# Patient Record
Sex: FEMALE | Race: WHITE | NOT HISPANIC OR LATINO | URBAN - METROPOLITAN AREA
[De-identification: names, ages, dates, MRNs, and addresses within clinical notes are randomized per-mention and may not be internally consistent; named-entity substitution may affect disease eponyms.]

---

## 2017-06-28 ENCOUNTER — APPOINTMENT (RX ONLY)
Dept: URBAN - METROPOLITAN AREA CLINIC 137 | Facility: CLINIC | Age: 82
Setting detail: DERMATOLOGY
End: 2017-06-28

## 2017-06-28 VITALS
SYSTOLIC BLOOD PRESSURE: 116 MMHG | WEIGHT: 132 LBS | HEIGHT: 67 IN | HEART RATE: 82 BPM | RESPIRATION RATE: 16 BRPM | DIASTOLIC BLOOD PRESSURE: 74 MMHG

## 2017-06-28 DIAGNOSIS — L85.3 XEROSIS CUTIS: ICD-10-CM

## 2017-06-28 DIAGNOSIS — L82.1 OTHER SEBORRHEIC KERATOSIS: ICD-10-CM

## 2017-06-28 PROBLEM — M12.9 ARTHROPATHY, UNSPECIFIED: Status: ACTIVE | Noted: 2017-06-28

## 2017-06-28 PROBLEM — Z85.828 PERSONAL HISTORY OF OTHER MALIGNANT NEOPLASM OF SKIN: Status: ACTIVE | Noted: 2017-06-28

## 2017-06-28 PROCEDURE — ? TREATMENT REGIMEN

## 2017-06-28 PROCEDURE — 99202 OFFICE O/P NEW SF 15 MIN: CPT

## 2017-06-28 PROCEDURE — ? COUNSELING

## 2017-06-28 ASSESSMENT — PAIN INTENSITY VAS: HOW INTENSE IS YOUR PAIN 0 BEING NO PAIN, 10 BEING THE MOST SEVERE PAIN POSSIBLE?: NO PAIN

## 2019-04-15 ENCOUNTER — APPOINTMENT (RX ONLY)
Dept: URBAN - METROPOLITAN AREA CLINIC 137 | Facility: CLINIC | Age: 84
Setting detail: DERMATOLOGY
End: 2019-04-15

## 2019-04-15 VITALS — DIASTOLIC BLOOD PRESSURE: 70 MMHG | RESPIRATION RATE: 16 BRPM | SYSTOLIC BLOOD PRESSURE: 142 MMHG

## 2019-04-15 DIAGNOSIS — Q80.8 OTHER CONGENITAL ICHTHYOSIS: ICD-10-CM

## 2019-04-15 DIAGNOSIS — I87.2 VENOUS INSUFFICIENCY (CHRONIC) (PERIPHERAL): ICD-10-CM

## 2019-04-15 DIAGNOSIS — L21.8 OTHER SEBORRHEIC DERMATITIS: ICD-10-CM

## 2019-04-15 DIAGNOSIS — D485 NEOPLASM OF UNCERTAIN BEHAVIOR OF SKIN: ICD-10-CM

## 2019-04-15 PROBLEM — E13.9 OTHER SPECIFIED DIABETES MELLITUS WITHOUT COMPLICATIONS: Status: ACTIVE | Noted: 2019-04-15

## 2019-04-15 PROBLEM — J30.1 ALLERGIC RHINITIS DUE TO POLLEN: Status: ACTIVE | Noted: 2019-04-15

## 2019-04-15 PROBLEM — D48.5 NEOPLASM OF UNCERTAIN BEHAVIOR OF SKIN: Status: ACTIVE | Noted: 2019-04-15

## 2019-04-15 PROCEDURE — ? COUNSELING

## 2019-04-15 PROCEDURE — ? TREATMENT REGIMEN

## 2019-04-15 PROCEDURE — 69100 BIOPSY OF EXTERNAL EAR: CPT

## 2019-04-15 PROCEDURE — 99213 OFFICE O/P EST LOW 20 MIN: CPT | Mod: 25

## 2019-04-15 PROCEDURE — ? BIOPSY BY SHAVE METHOD

## 2019-04-15 ASSESSMENT — LOCATION DETAILED DESCRIPTION DERM: LOCATION DETAILED: RIGHT SUPERIOR CRUS OF ANTIHELIX

## 2019-04-15 ASSESSMENT — LOCATION SIMPLE DESCRIPTION DERM: LOCATION SIMPLE: RIGHT EAR

## 2019-04-15 ASSESSMENT — LOCATION ZONE DERM: LOCATION ZONE: EAR

## 2019-04-15 NOTE — PROCEDURE: BIOPSY BY SHAVE METHOD
Size Of Lesion In Cm: 0.3
Anesthesia Volume In Cc: 0.5
Biopsy Type: H and E
Silver Nitrate Text: The wound bed was treated with silver nitrate after the biopsy was performed.
Hemostasis: Drysol
Electrodesiccation Text: The wound bed was treated with electrodesiccation after the biopsy was performed.
X Size Of Lesion In Cm: 0
Anesthesia Type: 1% lidocaine with epinephrine
Body Location Override (Optional - Billing Will Still Be Based On Selected Body Map Location If Applicable): above right ear
Was A Bandage Applied: Yes
Bill 26847 For Specimen Handling/Conveyance To Laboratory?: no
Dressing: bandage
Post-Care Instructions: I reviewed with the patient in detail post-care instructions. Patient is to keep the biopsy site dry overnight, and then apply bacitracin twice daily until healed. Patient may apply hydrogen peroxide soaks to remove any crusting.
Curettage Text: The wound bed was treated with curettage after the biopsy was performed.
Type Of Destruction Used: Curettage
Cryotherapy Text: The wound bed was treated with cryotherapy after the biopsy was performed.
Biopsy Method: Dermablade
Billing Type: Third-Party Bill
Wound Care: Petrolatum
Electrodesiccation And Curettage Text: The wound bed was treated with electrodesiccation and curettage after the biopsy was performed.
Detail Level: Detailed
Notification Instructions: Patient will be notified of biopsy results. However, patient instructed to call the office if not contacted within 2 weeks.
Consent: Written consent was obtained and risks were reviewed including but not limited to scarring, infection, bleeding, scabbing, incomplete removal, nerve damage and allergy to anesthesia.
Depth Of Biopsy: dermis

## 2019-04-15 NOTE — PROCEDURE: TREATMENT REGIMEN
Initiate Treatment: Apply hot water bottle to the affected areas of right lower leg daily for 10 minutes.
Detail Level: Zone
Initiate Treatment: Cerave

## 2019-04-16 ENCOUNTER — APPOINTMENT (RX ONLY)
Dept: URBAN - METROPOLITAN AREA CLINIC 137 | Facility: CLINIC | Age: 84
Setting detail: DERMATOLOGY
End: 2019-04-16

## 2019-04-16 PROCEDURE — 88305 TISSUE EXAM BY PATHOLOGIST: CPT | Mod: TC

## 2019-04-22 ENCOUNTER — APPOINTMENT (RX ONLY)
Dept: URBAN - METROPOLITAN AREA CLINIC 137 | Facility: CLINIC | Age: 84
Setting detail: DERMATOLOGY
End: 2019-04-22

## 2019-04-22 PROCEDURE — 88305 TISSUE EXAM BY PATHOLOGIST: CPT | Mod: 26

## 2019-05-08 ENCOUNTER — APPOINTMENT (RX ONLY)
Dept: URBAN - METROPOLITAN AREA CLINIC 137 | Facility: CLINIC | Age: 84
Setting detail: DERMATOLOGY
End: 2019-05-08

## 2019-05-08 DIAGNOSIS — Q80.8 OTHER CONGENITAL ICHTHYOSIS: ICD-10-CM

## 2019-05-08 DIAGNOSIS — L82.1 OTHER SEBORRHEIC KERATOSIS: ICD-10-CM

## 2019-05-08 DIAGNOSIS — L57.0 ACTINIC KERATOSIS: ICD-10-CM

## 2019-05-08 DIAGNOSIS — L95.0 LIVEDOID VASCULITIS: ICD-10-CM

## 2019-05-08 DIAGNOSIS — L81.4 OTHER MELANIN HYPERPIGMENTATION: ICD-10-CM

## 2019-05-08 PROBLEM — L29.8 OTHER PRURITUS: Status: ACTIVE | Noted: 2019-05-08

## 2019-05-08 PROBLEM — I10 ESSENTIAL (PRIMARY) HYPERTENSION: Status: ACTIVE | Noted: 2019-05-08

## 2019-05-08 PROCEDURE — 99213 OFFICE O/P EST LOW 20 MIN: CPT | Mod: 25

## 2019-05-08 PROCEDURE — ? COUNSELING

## 2019-05-08 PROCEDURE — 17000 DESTRUCT PREMALG LESION: CPT

## 2019-05-08 PROCEDURE — ? LIQUID NITROGEN (CM)

## 2019-05-08 ASSESSMENT — PAIN INTENSITY VAS: HOW INTENSE IS YOUR PAIN 0 BEING NO PAIN, 10 BEING THE MOST SEVERE PAIN POSSIBLE?: NO PAIN

## 2019-05-08 ASSESSMENT — LOCATION ZONE DERM: LOCATION ZONE: HAND

## 2019-05-08 ASSESSMENT — LOCATION SIMPLE DESCRIPTION DERM: LOCATION SIMPLE: LEFT HAND

## 2019-05-08 ASSESSMENT — LOCATION DETAILED DESCRIPTION DERM: LOCATION DETAILED: LEFT RADIAL DORSAL HAND

## 2019-05-08 NOTE — PROCEDURE: LIQUID NITROGEN
Detail Level: Detailed
Consent: Liquid N2 was applied with the cry-ac to «*» actinic type keratoses. Post cryo care was explained in detail. Return prn anything but the anticipated result of smooth, flat, whitish scar(s)--i.e., persistent discolored &/or tender &/or rough spot(s)-- or infection (explained). Return for FU in «*» days, prn before as discussed.  Potential for development of a white (hypo pigmented) scar at cryo site(s) emphasized prior to performing the cryo-Rx with pt. accepting the risk.
Duration Of Freeze Thaw-Cycle (Seconds): 10
Size Of Lesion In Cm (Optional): 0.8
Post-Care Instructions: I reviewed with the patient in detail post-care instructions. Patient is to wear sunprotection, and avoid picking at any of the treated lesions. Pt may apply Vaseline to crusted or scabbing areas.
X Size Of Lesion In Cm (Optional): 0
Number Of Freeze-Thaw Cycles: 1 freeze-thaw cycle

## 2021-09-03 ENCOUNTER — APPOINTMENT (RX ONLY)
Dept: URBAN - METROPOLITAN AREA CLINIC 136 | Facility: CLINIC | Age: 86
Setting detail: DERMATOLOGY
End: 2021-09-03

## 2021-09-03 VITALS — HEIGHT: 67 IN | WEIGHT: 129 LBS

## 2021-09-03 DIAGNOSIS — D485 NEOPLASM OF UNCERTAIN BEHAVIOR OF SKIN: ICD-10-CM

## 2021-09-03 DIAGNOSIS — L85.3 XEROSIS CUTIS: ICD-10-CM

## 2021-09-03 DIAGNOSIS — L08.9 LOCAL INFECTION OF THE SKIN AND SUBCUTANEOUS TISSUE, UNSPECIFIED: ICD-10-CM | Status: INADEQUATELY CONTROLLED

## 2021-09-03 PROBLEM — D48.5 NEOPLASM OF UNCERTAIN BEHAVIOR OF SKIN: Status: ACTIVE | Noted: 2021-09-03

## 2021-09-03 PROBLEM — Q80.0 ICHTHYOSIS VULGARIS: Status: ACTIVE | Noted: 2021-09-03

## 2021-09-03 PROCEDURE — ? TREATMENT REGIMEN

## 2021-09-03 PROCEDURE — ? PRESCRIPTION

## 2021-09-03 PROCEDURE — ? ORDER TESTS

## 2021-09-03 PROCEDURE — 99214 OFFICE O/P EST MOD 30 MIN: CPT

## 2021-09-03 PROCEDURE — ? COUNSELING

## 2021-09-03 RX ORDER — DOXYCYCLINE HYCLATE 100 MG/1
CAPSULE, GELATIN COATED ORAL
Qty: 28 | Refills: 0 | Status: ERX | COMMUNITY
Start: 2021-09-03

## 2021-09-03 RX ADMIN — DOXYCYCLINE HYCLATE: 100 CAPSULE, GELATIN COATED ORAL at 00:00

## 2021-09-03 ASSESSMENT — LOCATION DETAILED DESCRIPTION DERM
LOCATION DETAILED: RIGHT SUPERIOR PARIETAL SCALP
LOCATION DETAILED: RIGHT MEDIAL FRONTAL SCALP

## 2021-09-03 ASSESSMENT — LOCATION SIMPLE DESCRIPTION DERM
LOCATION SIMPLE: RIGHT SCALP
LOCATION SIMPLE: SCALP

## 2021-09-03 ASSESSMENT — LOCATION ZONE DERM: LOCATION ZONE: SCALP

## 2021-09-03 NOTE — PROCEDURE: COUNSELING
Detail Level: Detailed
Patient Specific Counseling (Will Not Stick From Patient To Patient): will bx in two weeks after finishing abx.

## 2021-09-03 NOTE — PROCEDURE: MIPS QUALITY
Quality 265: Biopsy Follow-Up: Biopsy results reviewed, communicated, tracked, and documented
Quality 226: Preventive Care And Screening: Tobacco Use: Screening And Cessation Intervention: Patient screened for tobacco use and is an ex/non-smoker
Quality 431: Preventive Care And Screening: Unhealthy Alcohol Use - Screening: Patient identified as an unhealthy alcohol user when screened for unhealthy alcohol use using a systematic screening method and received brief counseling
Detail Level: Detailed
Quality 110: Preventive Care And Screening: Influenza Immunization: Influenza Immunization Administered during Influenza season
Quality 130: Documentation Of Current Medications In The Medical Record: Current Medications Documented

## 2021-09-21 ENCOUNTER — APPOINTMENT (RX ONLY)
Dept: URBAN - METROPOLITAN AREA CLINIC 136 | Facility: CLINIC | Age: 86
Setting detail: DERMATOLOGY
End: 2021-09-21

## 2021-09-21 DIAGNOSIS — L08.9 LOCAL INFECTION OF THE SKIN AND SUBCUTANEOUS TISSUE, UNSPECIFIED: ICD-10-CM | Status: RESOLVING

## 2021-09-21 DIAGNOSIS — L85.3 XEROSIS CUTIS: ICD-10-CM

## 2021-09-21 PROCEDURE — ? COUNSELING

## 2021-09-21 PROCEDURE — ? TREATMENT REGIMEN

## 2021-09-21 PROCEDURE — 99213 OFFICE O/P EST LOW 20 MIN: CPT

## 2021-09-21 NOTE — PROCEDURE: COUNSELING
Detail Level: Detailed
Patient Specific Counseling (Will Not Stick From Patient To Patient): Continue using Vaseline on body.

## 2021-09-21 NOTE — PROCEDURE: TREATMENT REGIMEN
Detail Level: Zone
Plan is to follow up in 2 months, to make scalp is healed and no underlying skin cancer present